# Patient Record
Sex: FEMALE | Race: WHITE | Employment: OTHER | URBAN - METROPOLITAN AREA
[De-identification: names, ages, dates, MRNs, and addresses within clinical notes are randomized per-mention and may not be internally consistent; named-entity substitution may affect disease eponyms.]

---

## 2017-01-01 ENCOUNTER — APPOINTMENT (OUTPATIENT)
Dept: GENERAL RADIOLOGY | Facility: HOSPITAL | Age: 82
DRG: 872 | End: 2017-01-01
Attending: EMERGENCY MEDICINE
Payer: MEDICARE

## 2017-01-01 ENCOUNTER — APPOINTMENT (OUTPATIENT)
Dept: INTERVENTIONAL RADIOLOGY/VASCULAR | Facility: HOSPITAL | Age: 82
DRG: 872 | End: 2017-01-01
Attending: NURSE PRACTITIONER
Payer: MEDICARE

## 2017-01-01 ENCOUNTER — HOSPITAL ENCOUNTER (INPATIENT)
Facility: HOSPITAL | Age: 82
LOS: 1 days | Discharge: HOSPICE | DRG: 872 | End: 2017-01-01
Attending: EMERGENCY MEDICINE | Admitting: INTERNAL MEDICINE
Payer: MEDICARE

## 2017-01-01 ENCOUNTER — APPOINTMENT (OUTPATIENT)
Dept: CT IMAGING | Facility: HOSPITAL | Age: 82
DRG: 872 | End: 2017-01-01
Attending: EMERGENCY MEDICINE
Payer: MEDICARE

## 2017-01-01 ENCOUNTER — HOSPITAL ENCOUNTER (INPATIENT)
Facility: HOSPITAL | Age: 82
LOS: 5 days | DRG: 872 | End: 2017-01-01
Attending: INTERNAL MEDICINE | Admitting: INTERNAL MEDICINE
Payer: OTHER MISCELLANEOUS

## 2017-01-01 ENCOUNTER — LAB REQUISITION (OUTPATIENT)
Dept: LAB | Facility: HOSPITAL | Age: 82
End: 2017-01-01
Attending: INTERNAL MEDICINE
Payer: MEDICARE

## 2017-01-01 VITALS
WEIGHT: 134 LBS | SYSTOLIC BLOOD PRESSURE: 78 MMHG | HEIGHT: 66 IN | BODY MASS INDEX: 21.53 KG/M2 | RESPIRATION RATE: 30 BRPM | OXYGEN SATURATION: 95 % | DIASTOLIC BLOOD PRESSURE: 36 MMHG | HEART RATE: 105 BPM | TEMPERATURE: 101 F

## 2017-01-01 VITALS
HEART RATE: 85 BPM | DIASTOLIC BLOOD PRESSURE: 20 MMHG | OXYGEN SATURATION: 82 % | RESPIRATION RATE: 18 BRPM | TEMPERATURE: 100 F | SYSTOLIC BLOOD PRESSURE: 55 MMHG

## 2017-01-01 DIAGNOSIS — K80.50 CHOLEDOCHOLITHIASIS: ICD-10-CM

## 2017-01-01 DIAGNOSIS — Z79.02 LONG TERM CURRENT USE OF ANTITHROMBOTICS/ANTIPLATELETS: ICD-10-CM

## 2017-01-01 DIAGNOSIS — K81.0 ACUTE CHOLECYSTITIS: Primary | ICD-10-CM

## 2017-01-01 LAB
ALBUMIN SERPL-MCNC: 2.5 G/DL (ref 3.5–4.8)
ALBUMIN SERPL-MCNC: 3.1 G/DL (ref 3.5–4.8)
ALBUMIN SERPL-MCNC: 3.1 G/DL (ref 3.5–4.8)
ALP LIVER SERPL-CCNC: 106 U/L (ref 55–142)
ALP LIVER SERPL-CCNC: 108 U/L (ref 55–142)
ALP LIVER SERPL-CCNC: 85 U/L (ref 55–142)
ALT SERPL-CCNC: 19 U/L (ref 14–54)
ALT SERPL-CCNC: 26 U/L (ref 14–54)
ALT SERPL-CCNC: 31 U/L (ref 14–54)
AST SERPL-CCNC: 18 U/L (ref 15–41)
AST SERPL-CCNC: 27 U/L (ref 15–41)
AST SERPL-CCNC: 28 U/L (ref 15–41)
BASOPHILS # BLD AUTO: 0.04 X10(3) UL (ref 0–0.1)
BASOPHILS # BLD AUTO: 0.05 X10(3) UL (ref 0–0.1)
BASOPHILS # BLD AUTO: 0.07 X10(3) UL (ref 0–0.1)
BASOPHILS NFR BLD AUTO: 0.1 %
BASOPHILS NFR BLD AUTO: 0.2 %
BASOPHILS NFR BLD AUTO: 0.4 %
BILIRUB SERPL-MCNC: 0.7 MG/DL (ref 0.1–2)
BILIRUB SERPL-MCNC: 0.8 MG/DL (ref 0.1–2)
BILIRUB SERPL-MCNC: 0.9 MG/DL (ref 0.1–2)
BUN BLD-MCNC: 27 MG/DL (ref 8–20)
BUN BLD-MCNC: 27 MG/DL (ref 8–20)
BUN BLD-MCNC: 30 MG/DL (ref 8–20)
CALCIUM BLD-MCNC: 8.3 MG/DL (ref 8.3–10.3)
CALCIUM BLD-MCNC: 9 MG/DL (ref 8.3–10.3)
CALCIUM BLD-MCNC: 9 MG/DL (ref 8.3–10.3)
CHLORIDE: 106 MMOL/L (ref 101–111)
CHLORIDE: 108 MMOL/L (ref 101–111)
CHLORIDE: 109 MMOL/L (ref 101–111)
CO2: 21 MMOL/L (ref 22–32)
CO2: 22 MMOL/L (ref 22–32)
CO2: 22 MMOL/L (ref 22–32)
CREAT BLD-MCNC: 1.31 MG/DL (ref 0.55–1.02)
CREAT BLD-MCNC: 1.41 MG/DL (ref 0.55–1.02)
CREAT BLD-MCNC: 1.5 MG/DL (ref 0.55–1.02)
EOSINOPHIL # BLD AUTO: 0 X10(3) UL (ref 0–0.3)
EOSINOPHIL # BLD AUTO: 0 X10(3) UL (ref 0–0.3)
EOSINOPHIL # BLD AUTO: 0.16 X10(3) UL (ref 0–0.3)
EOSINOPHIL NFR BLD AUTO: 0 %
EOSINOPHIL NFR BLD AUTO: 0 %
EOSINOPHIL NFR BLD AUTO: 0.8 %
ERYTHROCYTE [DISTWIDTH] IN BLOOD BY AUTOMATED COUNT: 15.5 % (ref 11.5–16)
ERYTHROCYTE [DISTWIDTH] IN BLOOD BY AUTOMATED COUNT: 15.7 % (ref 11.5–16)
ERYTHROCYTE [DISTWIDTH] IN BLOOD BY AUTOMATED COUNT: 15.8 % (ref 11.5–16)
GLUCOSE BLD-MCNC: 170 MG/DL (ref 65–99)
GLUCOSE BLD-MCNC: 177 MG/DL (ref 65–99)
GLUCOSE BLD-MCNC: 189 MG/DL (ref 65–99)
GLUCOSE BLD-MCNC: 205 MG/DL (ref 70–99)
GLUCOSE BLD-MCNC: 215 MG/DL (ref 70–99)
GLUCOSE BLD-MCNC: 217 MG/DL (ref 70–99)
GLUCOSE BLD-MCNC: 232 MG/DL (ref 65–99)
HAV IGM SER QL: 1.8 MG/DL (ref 1.7–3)
HCT VFR BLD AUTO: 35.3 % (ref 34–50)
HCT VFR BLD AUTO: 37 % (ref 34–50)
HCT VFR BLD AUTO: 39.5 % (ref 34–50)
HGB BLD-MCNC: 11.4 G/DL (ref 12–16)
HGB BLD-MCNC: 12 G/DL (ref 12–16)
HGB BLD-MCNC: 13.2 G/DL (ref 12–16)
IMMATURE GRANULOCYTE COUNT: 0.1 X10(3) UL (ref 0–1)
IMMATURE GRANULOCYTE COUNT: 0.2 X10(3) UL (ref 0–1)
IMMATURE GRANULOCYTE COUNT: 0.21 X10(3) UL (ref 0–1)
IMMATURE GRANULOCYTE RATIO %: 0.5 %
IMMATURE GRANULOCYTE RATIO %: 0.6 %
IMMATURE GRANULOCYTE RATIO %: 0.7 %
INR BLD: 1.52 (ref 0.89–1.11)
LACTIC ACID: 1.4 MMOL/L (ref 0.5–2)
LACTIC ACID: 1.7 MMOL/L (ref 0.5–2)
LACTIC ACID: 2.1 MMOL/L (ref 0.5–2)
LIPASE: 91 U/L (ref 73–393)
LYMPHOCYTES # BLD AUTO: 1.83 X10(3) UL (ref 0.9–4)
LYMPHOCYTES # BLD AUTO: 2.12 X10(3) UL (ref 0.9–4)
LYMPHOCYTES # BLD AUTO: 2.2 X10(3) UL (ref 0.9–4)
LYMPHOCYTES NFR BLD AUTO: 11.2 %
LYMPHOCYTES NFR BLD AUTO: 5.6 %
LYMPHOCYTES NFR BLD AUTO: 7.2 %
M PROTEIN MFR SERPL ELPH: 5.7 G/DL (ref 6.1–8.3)
M PROTEIN MFR SERPL ELPH: 6.1 G/DL (ref 6.1–8.3)
M PROTEIN MFR SERPL ELPH: 6.8 G/DL (ref 6.1–8.3)
MCH RBC QN AUTO: 29.6 PG (ref 27–33.2)
MCH RBC QN AUTO: 29.6 PG (ref 27–33.2)
MCH RBC QN AUTO: 30 PG (ref 27–33.2)
MCHC RBC AUTO-ENTMCNC: 32.3 G/DL (ref 31–37)
MCHC RBC AUTO-ENTMCNC: 32.4 G/DL (ref 31–37)
MCHC RBC AUTO-ENTMCNC: 33.4 G/DL (ref 31–37)
MCV RBC AUTO: 89.8 FL (ref 81–100)
MCV RBC AUTO: 91.4 FL (ref 81–100)
MCV RBC AUTO: 91.7 FL (ref 81–100)
MONOCYTES # BLD AUTO: 1.69 X10(3) UL (ref 0.1–0.6)
MONOCYTES # BLD AUTO: 2.21 X10(3) UL (ref 0.1–0.6)
MONOCYTES # BLD AUTO: 2.65 X10(3) UL (ref 0.1–0.6)
MONOCYTES NFR BLD AUTO: 6.7 %
MONOCYTES NFR BLD AUTO: 8.6 %
MONOCYTES NFR BLD AUTO: 9 %
NEUTROPHIL ABS PRELIM: 14.72 X10 (3) UL (ref 1.3–6.7)
NEUTROPHIL ABS PRELIM: 25.61 X10 (3) UL (ref 1.3–6.7)
NEUTROPHIL ABS PRELIM: 28.61 X10 (3) UL (ref 1.3–6.7)
NEUTROPHILS # BLD AUTO: 14.72 X10(3) UL (ref 1.3–6.7)
NEUTROPHILS # BLD AUTO: 25.61 X10(3) UL (ref 1.3–6.7)
NEUTROPHILS # BLD AUTO: 28.61 X10(3) UL (ref 1.3–6.7)
NEUTROPHILS NFR BLD AUTO: 78.1 %
NEUTROPHILS NFR BLD AUTO: 83.4 %
NEUTROPHILS NFR BLD AUTO: 86.9 %
PLATELET # BLD AUTO: 242 10(3)UL (ref 150–450)
PLATELET # BLD AUTO: 272 10(3)UL (ref 150–450)
PLATELET # BLD AUTO: 282 10(3)UL (ref 150–450)
POTASSIUM SERPL-SCNC: 4.2 MMOL/L (ref 3.6–5.1)
POTASSIUM SERPL-SCNC: 4.5 MMOL/L (ref 3.6–5.1)
POTASSIUM SERPL-SCNC: 4.6 MMOL/L (ref 3.6–5.1)
PSA SERPL DL<=0.01 NG/ML-MCNC: 18.5 SECONDS (ref 12–14.3)
RBC # BLD AUTO: 3.85 X10(6)UL (ref 3.8–5.1)
RBC # BLD AUTO: 4.05 X10(6)UL (ref 3.8–5.1)
RBC # BLD AUTO: 4.4 X10(6)UL (ref 3.8–5.1)
RED CELL DISTRIBUTION WIDTH-SD: 50.9 FL (ref 35.1–46.3)
RED CELL DISTRIBUTION WIDTH-SD: 52.8 FL (ref 35.1–46.3)
RED CELL DISTRIBUTION WIDTH-SD: 53.1 FL (ref 35.1–46.3)
SODIUM SERPL-SCNC: 138 MMOL/L (ref 136–144)
SODIUM SERPL-SCNC: 140 MMOL/L (ref 136–144)
SODIUM SERPL-SCNC: 140 MMOL/L (ref 136–144)
WBC # BLD AUTO: 18.9 X10(3) UL (ref 4–13)
WBC # BLD AUTO: 30.7 X10(3) UL (ref 4–13)
WBC # BLD AUTO: 32.9 X10(3) UL (ref 4–13)

## 2017-01-01 PROCEDURE — BF13YZZ FLUOROSCOPY OF GALLBLADDER AND BILE DUCTS USING OTHER CONTRAST: ICD-10-PCS | Performed by: RADIOLOGY

## 2017-01-01 PROCEDURE — 87186 SC STD MICRODIL/AGAR DIL: CPT | Performed by: INTERNAL MEDICINE

## 2017-01-01 PROCEDURE — 87205 SMEAR GRAM STAIN: CPT | Performed by: INTERNAL MEDICINE

## 2017-01-01 PROCEDURE — 87075 CULTR BACTERIA EXCEPT BLOOD: CPT | Performed by: INTERNAL MEDICINE

## 2017-01-01 PROCEDURE — 85025 COMPLETE CBC W/AUTO DIFF WBC: CPT | Performed by: INTERNAL MEDICINE

## 2017-01-01 PROCEDURE — 87070 CULTURE OTHR SPECIMN AEROBIC: CPT | Performed by: INTERNAL MEDICINE

## 2017-01-01 PROCEDURE — 0F9430Z DRAINAGE OF GALLBLADDER WITH DRAINAGE DEVICE, PERCUTANEOUS APPROACH: ICD-10-PCS | Performed by: RADIOLOGY

## 2017-01-01 PROCEDURE — 99223 1ST HOSP IP/OBS HIGH 75: CPT | Performed by: CLINICAL NURSE SPECIALIST

## 2017-01-01 PROCEDURE — 87077 CULTURE AEROBIC IDENTIFY: CPT | Performed by: INTERNAL MEDICINE

## 2017-01-01 PROCEDURE — 74176 CT ABD & PELVIS W/O CONTRAST: CPT | Performed by: EMERGENCY MEDICINE

## 2017-01-01 PROCEDURE — 80053 COMPREHEN METABOLIC PANEL: CPT | Performed by: INTERNAL MEDICINE

## 2017-01-01 PROCEDURE — 99291 CRITICAL CARE FIRST HOUR: CPT | Performed by: INTERNAL MEDICINE

## 2017-01-01 PROCEDURE — 71010 XR CHEST AP PORTABLE  (CPT=71010): CPT | Performed by: EMERGENCY MEDICINE

## 2017-01-01 RX ORDER — GLYCOPYRROLATE 0.2 MG/ML
0.4 INJECTION, SOLUTION INTRAMUSCULAR; INTRAVENOUS
Status: DISCONTINUED | OUTPATIENT
Start: 2017-01-01 | End: 2017-01-01

## 2017-01-01 RX ORDER — SODIUM CHLORIDE 9 MG/ML
INJECTION, SOLUTION INTRAVENOUS CONTINUOUS
Status: ACTIVE | OUTPATIENT
Start: 2017-01-01 | End: 2017-01-01

## 2017-01-01 RX ORDER — SCOLOPAMINE TRANSDERMAL SYSTEM 1 MG/1
1 PATCH, EXTENDED RELEASE TRANSDERMAL
Status: DISCONTINUED | OUTPATIENT
Start: 2017-01-01 | End: 2017-01-01

## 2017-01-01 RX ORDER — INSULIN ASPART 100 [IU]/ML
5 INJECTION, SOLUTION INTRAVENOUS; SUBCUTANEOUS EVERY EVENING
COMMUNITY
End: 2017-01-01

## 2017-01-01 RX ORDER — LORAZEPAM 2 MG/ML
2 INJECTION INTRAMUSCULAR EVERY 4 HOURS PRN
Status: CANCELLED | OUTPATIENT
Start: 2017-01-01

## 2017-01-01 RX ORDER — SODIUM CHLORIDE 9 MG/ML
INJECTION, SOLUTION INTRAVENOUS CONTINUOUS
Status: DISCONTINUED | OUTPATIENT
Start: 2017-01-01 | End: 2017-01-01

## 2017-01-01 RX ORDER — MORPHINE SULFATE 10 MG/5ML
10 SOLUTION ORAL EVERY 2 HOUR PRN
Status: DISCONTINUED | OUTPATIENT
Start: 2017-01-01 | End: 2017-01-01

## 2017-01-01 RX ORDER — HYDROMORPHONE HYDROCHLORIDE 4 MG/1
2 TABLET ORAL EVERY 2 HOUR PRN
Status: DISCONTINUED | OUTPATIENT
Start: 2017-01-01 | End: 2017-01-01

## 2017-01-01 RX ORDER — DEXTROSE MONOHYDRATE 25 G/50ML
50 INJECTION, SOLUTION INTRAVENOUS
Status: DISCONTINUED | OUTPATIENT
Start: 2017-01-01 | End: 2017-01-01

## 2017-01-01 RX ORDER — HYDROMORPHONE HYDROCHLORIDE 1 MG/ML
0.5 INJECTION, SOLUTION INTRAMUSCULAR; INTRAVENOUS; SUBCUTANEOUS
Status: DISCONTINUED | OUTPATIENT
Start: 2017-01-01 | End: 2017-01-01

## 2017-01-01 RX ORDER — ONDANSETRON 2 MG/ML
4 INJECTION INTRAMUSCULAR; INTRAVENOUS EVERY 6 HOURS PRN
Status: CANCELLED | OUTPATIENT
Start: 2017-01-01

## 2017-01-01 RX ORDER — INSULIN ASPART 100 [IU]/ML
4 INJECTION, SOLUTION INTRAVENOUS; SUBCUTANEOUS
COMMUNITY
End: 2017-01-01

## 2017-01-01 RX ORDER — ONDANSETRON 2 MG/ML
4 INJECTION INTRAMUSCULAR; INTRAVENOUS EVERY 6 HOURS PRN
Status: DISCONTINUED | OUTPATIENT
Start: 2017-01-01 | End: 2017-01-01

## 2017-01-01 RX ORDER — GARLIC EXTRACT 500 MG
1 CAPSULE ORAL DAILY
COMMUNITY
End: 2017-01-01

## 2017-01-01 RX ORDER — LORAZEPAM 2 MG/ML
1 INJECTION INTRAMUSCULAR EVERY 4 HOURS PRN
Status: DISCONTINUED | OUTPATIENT
Start: 2017-01-01 | End: 2017-01-01

## 2017-01-01 RX ORDER — HYDROMORPHONE HYDROCHLORIDE 1 MG/ML
0.5 INJECTION, SOLUTION INTRAMUSCULAR; INTRAVENOUS; SUBCUTANEOUS EVERY 4 HOURS PRN
Status: DISCONTINUED | OUTPATIENT
Start: 2017-01-01 | End: 2017-01-01

## 2017-01-01 RX ORDER — ONDANSETRON 4 MG/1
4 TABLET, ORALLY DISINTEGRATING ORAL EVERY 6 HOURS PRN
Status: DISCONTINUED | OUTPATIENT
Start: 2017-01-01 | End: 2017-01-01

## 2017-01-01 RX ORDER — MORPHINE SULFATE 10 MG/5ML
5 SOLUTION ORAL
Status: DISCONTINUED | OUTPATIENT
Start: 2017-01-01 | End: 2017-01-01

## 2017-01-01 RX ORDER — 0.9 % SODIUM CHLORIDE 0.9 %
3 VIAL (ML) INJECTION EVERY 8 HOURS
COMMUNITY
End: 2017-01-01

## 2017-01-01 RX ORDER — DILTIAZEM HYDROCHLORIDE 5 MG/ML
5 INJECTION INTRAVENOUS ONCE
Status: COMPLETED | OUTPATIENT
Start: 2017-01-01 | End: 2017-01-01

## 2017-01-01 RX ORDER — LORAZEPAM 2 MG/ML
2 INJECTION INTRAMUSCULAR EVERY 4 HOURS PRN
Status: DISCONTINUED | OUTPATIENT
Start: 2017-01-01 | End: 2017-01-01

## 2017-01-01 RX ORDER — DIGOXIN 0.25 MG/ML
250 INJECTION INTRAMUSCULAR; INTRAVENOUS ONCE
Status: COMPLETED | OUTPATIENT
Start: 2017-01-01 | End: 2017-01-01

## 2017-01-01 RX ORDER — LORAZEPAM 2 MG/ML
0.5 INJECTION INTRAMUSCULAR EVERY 4 HOURS PRN
Status: DISCONTINUED | OUTPATIENT
Start: 2017-01-01 | End: 2017-01-01

## 2017-01-01 RX ORDER — DILTIAZEM HYDROCHLORIDE 5 MG/ML
5 INJECTION INTRAVENOUS EVERY 6 HOURS PRN
Status: DISCONTINUED | OUTPATIENT
Start: 2017-01-01 | End: 2017-01-01

## 2017-01-01 RX ORDER — LATANOPROST 50 UG/ML
1 SOLUTION/ DROPS OPHTHALMIC NIGHTLY
COMMUNITY
End: 2017-01-01

## 2017-01-01 RX ORDER — BISACODYL 10 MG
10 SUPPOSITORY, RECTAL RECTAL
Status: DISCONTINUED | OUTPATIENT
Start: 2017-01-01 | End: 2017-01-01

## 2017-01-01 RX ORDER — LIDOCAINE HYDROCHLORIDE 10 MG/ML
INJECTION, SOLUTION INFILTRATION; PERINEURAL
Status: COMPLETED
Start: 2017-01-01 | End: 2017-01-01

## 2017-01-01 RX ORDER — CALCIUM CARBONATE 200(500)MG
1 TABLET,CHEWABLE ORAL EVERY 6 HOURS PRN
COMMUNITY
End: 2017-01-01

## 2017-01-01 RX ORDER — HYDROMORPHONE HYDROCHLORIDE 1 MG/ML
0.5 INJECTION, SOLUTION INTRAMUSCULAR; INTRAVENOUS; SUBCUTANEOUS
Status: CANCELLED | OUTPATIENT
Start: 2017-01-01

## 2017-01-01 RX ORDER — MELATONIN
325
COMMUNITY
End: 2017-01-01

## 2017-01-01 RX ORDER — LORAZEPAM 2 MG/ML
1 INJECTION INTRAMUSCULAR EVERY 4 HOURS PRN
Status: CANCELLED | OUTPATIENT
Start: 2017-01-01

## 2017-01-01 RX ORDER — ACETAMINOPHEN 650 MG/1
650 SUPPOSITORY RECTAL EVERY 4 HOURS PRN
Status: DISCONTINUED | OUTPATIENT
Start: 2017-01-01 | End: 2017-01-01

## 2017-01-01 RX ORDER — MIDAZOLAM HYDROCHLORIDE 1 MG/ML
INJECTION INTRAMUSCULAR; INTRAVENOUS
Status: COMPLETED
Start: 2017-01-01 | End: 2017-01-01

## 2017-09-18 PROBLEM — K81.0 ACUTE CHOLECYSTITIS: Status: ACTIVE | Noted: 2017-01-01

## 2017-09-18 PROBLEM — K80.50 CHOLEDOCHOLITHIASIS: Status: ACTIVE | Noted: 2017-01-01

## 2017-09-18 NOTE — ED PROVIDER NOTES
Patient Seen in: [facility] Emergency Department    History   Patient presents with:  Abnormal Labs    Stated Complaint: abnormal labs    HPI    The patient is an 44-year-old female who resides [facility], and is a palliative care patient, r 93  Resp: 16  Temp: 98.7 °F (37.1 °C)  Temp src: Temporal  SpO2: 95 %  O2 Device: None (Room air)    Current:/47   Pulse 89   Temp 99.1 °F (37.3 °C) (Oral)   Resp 18   Ht 167.6 cm (5' 6\")   Wt 60.8 kg   SpO2 98%   Breastfeeding?  No   BMI 21.63 kg/m² Abnormality         Status                     ---------                               -----------         ------                     CBC W/ DIFFERENTIAL[[id number]]          Abnormal            Final result                 Please view r distended. There is cholelithiasis with hyperdense layering sludge. There is pericholecystic fluid tracking to the region of the yaa hepatis. Gallbladder wall thickening with soft tissue stranding.  In addition there is new punctate gas in the region of t pleural thickening.   Dictated by: Daquan Pettit MD on 9/18/2017 at 18:20     Approved by: Daquan Pettit MD            Xr Chest Ap Portable  (cpt=71010)    Result Date: 9/18/2017  PROCEDURE:  XR CHEST AP PORTABLE (CPT=71010)  TECHNIQUE:  AP chest radiogra Date Reviewed: 5/23/2016          ICD-10-CM Noted POA    * (Principal)Acute cholecystitis K81.0 9/18/2017 Unknown    Choledocholithiasis K80.50 9/18/2017

## 2017-09-18 NOTE — ED INITIAL ASSESSMENT (HPI)
Pt sent here for elevated WBC of 28.17, pt c/o cough. Pt c/o RLQ abd pain, worse upon movement, pt family sts pt vomited x2 today and x4 since yesterday.

## 2017-09-19 PROBLEM — I48.91 ATRIAL FIBRILLATION WITH RVR (HCC): Status: ACTIVE | Noted: 2017-01-01

## 2017-09-19 PROBLEM — Z51.5 PALLIATIVE CARE ENCOUNTER: Status: ACTIVE | Noted: 2017-01-01

## 2017-09-19 PROBLEM — A41.9 SEPSIS (HCC): Status: ACTIVE | Noted: 2017-01-01

## 2017-09-19 NOTE — CONSULTS
[facility]                       [facility]astroenterology Consultation-[facility]    [facility] Patient Status:  Inpatient    1933 MRN [id number]   [facility] 3NW-A Attending Juan Villatoro MD   [facility] Day # 1 PCP Alexsandra Hahn premix/add-vantage 5 mg/hr Intravenous Continuous   0.9%  NaCl infusion  Intravenous Continuous   [COMPLETED] digoxin (LANOXIN) 250 MCG/ML injection 250 mcg 250 mcg Intravenous Once   [COMPLETED] Piperacillin Sod-Tazobactam So (ZOSYN) 3.375 g in dextrose 5 infections, hematuria, dysuria, or nephrolithiasis           Psychiatric:  no history of depression, anxiety, suicidal ideation, or homicidal ideation           Oncologic: no history of prior solid tumor or hematologic malignancy           ENT: no hoarsene 106  109   CO2  22.0  22.0  21.0*     Recent Labs   Lab  09/19/17   0311   RBC  4.05   HGB  12.0   HCT  37.0   MCV  91.4   MCH  29.6   MCHC  32.4   RDW  15.7   NEPRELIM  25.61*   WBC  30.7*   PLT  242.0       Recent Labs   Lab  09/17/17 2055 09/18/17 the head of the pancreas  PANCREAS:  Atrophic. SPLEEN:  Normal.  No enlargement or focal lesion. AORTA/VASCULAR:  No abdominal aortic aneurysm. Dense aortoiliac calcification. RETROPERITONEUM:  No enlarged adenopathy.     BOWEL/MESENTERY:  Normal c region of cystic duct and distal CBD) on CT imaging. Labs revealed leukocytosis (30.7 this AM) with normal LFTs. She has diffuse abd tenderness, vikash RUQ.  The pt and her daughter Crystal Baez are agreeable to IR cholecystostomy however would like to think about

## 2017-09-19 NOTE — PROGRESS NOTES
Per [facility] APN, all treatment team notified of patient's hospice status.  [address], Yo CISNEROS HENRI, message left for radiologist.)

## 2017-09-19 NOTE — PROGRESS NOTES
[facility] Dosing Service  Antibiotic Dosing    Aguila Sequeira is a 80year old female for whom pharmacy is dosing Zosyn for treatment of acute cholecystitis. Allergies: is allergic to turkey.     Vitals: /47   Pulse 89   Temp 99.1 °F (37.3 °C)

## 2017-09-19 NOTE — PROGRESS NOTES
Patient left for IR drain placement. Cardizem drip infusing at 5 mg/hr. IV fluids infusing as well at this time. Adelene Feeling GI PA speaking to patient's daughter Yordan Marvin. Vital signs stable. Will continue to monitor.

## 2017-09-19 NOTE — CONSULTS
[facility]  Cardiology Consultation    Rosita Montague Patient Status:  Inpatient    1933 MRN [id number]   [facility] 3NW-A Attending Ulises Alarcon MD   Hosp Day # 1 PCP Reshma Pineda MD     Reason for Consultation:  AFib with (CARDIZEM) premix/add-vantage, 5 mg/hr, Intravenous, Continuous  •  0.9%  NaCl infusion, , Intravenous, Continuous  •  ondansetron HCl (ZOFRAN) injection 4 mg, 4 mg, Intravenous, Q6H PRN  •  HYDROmorphone HCl PF (DILAUDID) 1 MG/ML injection 0.5 mg, 0.5 mg, 09/19/2017   HGB 12.0 09/19/2017   HCT 37.0 09/19/2017   .0 09/19/2017   CREATSERUM 1.31 09/19/2017   BUN 27 09/19/2017    09/19/2017   K 4.2 09/19/2017    09/19/2017   CO2 21.0 09/19/2017    09/19/2017   CA 8.3 09/19/2017   ALB 2

## 2017-09-19 NOTE — HOSPICE RN NOTE
Met with patients daughter and obtained hospice consents. Patient appears appropriate for inpatient hospice for management of pain/agitation. Due to abnormal vitals I do not feel she would be stable for transport to any other care setting at this time.  Wi

## 2017-09-19 NOTE — PROCEDURES
[facility]    Emerald Zelaya Patient Status:  Inpatient    1933 MRN [id number]   [facility] 3NW-A Attending Ector Stone MD   Hosp Day # 1 PCP Dilcia August MD         Brief Procedure Report    Pre-Operative Diagnosis: Acute

## 2017-09-19 NOTE — PROGRESS NOTES
Gomez catheter inserted at this time per order / family request. Patient tolerated well. 200 mL cristhian colored urine noted in gomez bag. Secured to right thigh.

## 2017-09-19 NOTE — PHYSICAL THERAPY NOTE
PT evaluation received, however a rapid response was called on pt this am d/t elevated HR and being hypotensive. Pt now off the floor for an IR drain placement for her gallbladder as well.  Per RN Enoc Nugent, pt will be having an ERCP performed either today or

## 2017-09-19 NOTE — CODE DOCUMENTATION
[facility]  RAPID RESPONSE NOTE     Rosa Bah Patient Status:  Inpatient    1933 MRN [id number]   Location [unfilled] Attending Juan Villatoro MD   Hosp Day # 1 PCP Carol Reeves MD     Reason for RRT: Rapid response called by RN at 2

## 2017-09-19 NOTE — PAYOR COMM NOTE
--------------  ADMISSION REVIEW     Payor: Kirby Jimi #:  [id number]  Authorization Number: N/A    Admit date: 9/18/17  Admit time: 2126       Admitting Physician: Alena Hess MD  Attending Physician:  Alena Hess MD  [facility] ---------                               -----------         ------                     CBC W/ DIFFERENTIAL[[id number]]          Abnormal            Final result                 Please view results for these tests on the individual orders.    SCAN Action Dose Route User    9/19/2017 0315 Given 5 mg Intravenous Giovanni Byrd RN      Insulin Aspart Pen (NOVOLOG) 100 UNIT/ML flexpen 1-5 Units     Date Action Dose Route User    9/19/2017 0714 Given 1 Units Subcutaneous (Left Upper Arm) Giovanni Byrd,

## 2017-09-19 NOTE — PLAN OF CARE
NURSING ADMISSION NOTE      Patient admitted via cart. Oriented to room. Safety precautions initiated. Bed in low position. Call light in reach.     CARDIOVASCULAR - ADULT    • Maintains optimal cardiac output and hemodynamic stability Progressing

## 2017-09-19 NOTE — CONSULTS
[address]  [id number]  Hospital Day #1  Date of Consult: 09/19/17       Reason for Consultation: Consult requested for evaluation of palliative care needs and goals of care discussion. of life: Daughter states she encouraged the patient to be evaluated for the abdominal pain and feels she needs to give the patient \"a chance\". Daughter states patient Genet Ann" in the past and wants to see if the patient recovers.   Yet, the daught PM

## 2017-09-19 NOTE — PROGRESS NOTES
Rapid response called at this time due to irregular patterns of blood pressure. HR consistent in 110's. Patient pale. Less responsive. Temp increasing to 100.3. No rigors noted.  [facility] Cardiology APN, Dr. Anaid Chan responded and spoke to patient's Katty Waldrop

## 2017-09-19 NOTE — PALLIATIVE CARE NOTE
PCSW sent referral to [facility] to evaluate for inpatient hospice. [facility] RN/Sri to meet with family this afternoon. No further PCSW needs. PC APN/Adilia notified.

## 2017-09-19 NOTE — PROCEDURES
[facility]  Pre-Procedure Note    Name: Nam Birch  MRN#: [id number]  : 1933    Procedure:  Ultrasound Guided Percutaneous cholecystostomy tube placement    Indication: Acute cholecystitis    Allergies:      Malian  [place]                  Glenny

## 2017-09-19 NOTE — PLAN OF CARE
Pt -170 this am. Hypotensive. Pt orientated. RR called. IVF bolus, IV cardizem and bolus provided during RR. Spoke with Dr. Ronald Ramirez update. Cardiology consulted, digoxin provided per cardiology. Pt daughter updated. Pt asking for her daughter.  Giuliana Lambert

## 2017-09-19 NOTE — PROGRESS NOTES
Patient returning from IR drain placement. R abdominal/flank dressing clean, dry and intact. Brown/red drainage noted in the tubing. Patient displaying rigors. Temperature 98. 3. . Blood pressure ranging from 140/113 - 87/45.  Noticed at this time the

## 2017-09-19 NOTE — CM/SW NOTE
MSW reviewed chart and called Roberto Epstein. Patient is LTC at [facility]. MSW directed by staff that patient was current with [facility].  MSW called [facility] [phone] and was told patient was seen for 1x visit but d[facility]c from services/not [facility]

## 2017-09-19 NOTE — H&P
[facility]    PATIENT'S NAME: Liane Mota   ATTENDING PHYSICIAN: Jennifer Gardner M.D.    PATIENT ACCOUNT#:   [de-identified]    LOCATION:  [address] [place] [address]  MEDICAL RECORD #:   [id number]       YOB: 1933  ADMISSION DATE:       09/18/2017 quit 14 years ago. CODE STATUS:  DO NOT RESUSCITATE. REVIEW OF SYSTEMS:  Positive for nausea. Positive for abdominal pain. Negative for vomiting. Negative for cough, congestions. Positive for fevers.   Negative for blood in urine, sputum, or st

## 2017-09-20 NOTE — PLAN OF CARE
COPING    • Pt/Family able to verbalize concerns and demonstrate effective coping strategies Progressing          PAIN - ADULT    • Verbalizes/displays adequate comfort level or patient's stated pain goal Progressing        Assumed care of patient at [address]

## 2017-09-20 NOTE — H&P
[address]    PATIENT'S NAME: Aurora Mcguire   ATTENDING PHYSICIAN: Anil Brand M.D.    PATIENT ACCOUNT#:   [de-identified]    LOCATION:  [address] [place] [address]  MEDICAL RECORD #:   [id number]       YOB: 1933  ADMISSION DATE:       09/19/2017 stool.        PHYSICAL EXAMINATION:    GENERAL:  Condition poor. lethargic. HEENT:  Head:  Limited exam.  Eyes:  Legally blind. NECK:  Supple. LUNGS:  Decreased breath sounds at the bases. No rhonchi or wheeze.    HEART:  Irregularly irregular rhy

## 2017-09-20 NOTE — PLAN OF CARE
Pt/Family able to verbalize concerns and demonstrate effective coping strategies Progressing      Verbalizes/displays adequate comfort level or patient's stated pain goal Progressing      Pt resting in bed this morning, pt appears to be comfortable.  Pt johnson

## 2017-09-20 NOTE — PROGRESS NOTES
09/19/17 2200   Clinical Encounter Type   Visited With Patient and family together   Routine Visit Introduction  ( paged by nurse for impending death. /)   Continue Visiting No   Crisis Visit Patient actively dying   Patient Spiritual Encounters

## 2017-09-20 NOTE — CM/SW NOTE
VIDA barnett  met with Pt's dtr's significant other who provided history of previous loss. The pt appeared comfortable, shallow but regular breathing. The pt's dtr went home to shower, as the couple had spent the night.  VIDA provided emotional support, active l

## 2017-09-20 NOTE — HOSPICE RN NOTE
Hospice inpatient visit    Received patient in bed, family is at bedside. Patient is unresponsive, does not appear to be in any pain or distress but appears imminent at this time.   Patients Hydromorphone drip is currently effective, titration orders are in

## 2017-09-20 NOTE — CM/SW NOTE
09/20/17 0800   CM/SW Screening   Referral Source Social Work (self-referral)   Information Source Chart review       MSW reviewed chart.  Patient is [facility] w[facility].

## 2017-09-21 NOTE — PROGRESS NOTES
09/21/17 0631   Clinical Encounter Type   Visited With Health care provider;Family Luz Ascencio volunteer Neo Cervantes)    came to relieve volunteer at 6 a.m.  debriefed with her, and then patient's daughter Shane Kennedy arrived.    provided

## 2017-09-21 NOTE — PROGRESS NOTES
09/21/17 0440   Patient Spiritual Encounters   Spiritual Interventions Volunteer Quynh Patient arrived and was instructed on procedures as this was her first [facility] [address]. Nurse informed  family is very timely, and said they would return at 10 a.m.   No more vo

## 2017-09-21 NOTE — PROGRESS NOTES
09/21/17 0327   Clinical Encounter Type   Crisis Visit Patient actively dying  (call to set up [facility])   Referral From Nurse   Referral To Manuelito Dugan was called by nurse Graeme Sims for a possible MARLENE patient burris.    arrived and assesse

## 2017-09-21 NOTE — PROGRESS NOTES
[facility]  Progress Note    Michela Pappas Patient Status:  Inpatient    1933 MRN [id number]   [facility] 3NW-A Attending Malu Amaro MD   Hosp Day # 2 PCP Sarbjit Rehman MD          Subjective:  Michela Pappas is a(n) 80 Disp:  Rfl:    furosemide (LASIX) 20 MG Oral Tab Take 20 mg by mouth daily. Indications: Edema Disp:  Rfl:    magnesium hydroxide (MILK OF MAGNESIA) 400 MG/5ML Oral Suspension Take 30 mL by mouth 2 (two) times daily as needed for constipation.  Disp:  Rfl:

## 2017-09-21 NOTE — PROGRESS NOTES
09/21/17 4131   Clinical Encounter Type   Visited With Patient and family together  (Daughter and significant other at bedside)   Routine Visit Follow-up   Continue Visiting Yes   Crisis Visit Patient actively dying   Patient's Supportive Strategies/Res

## 2017-09-21 NOTE — HOSPICE RN NOTE
General inpatient day 3- patient assessed in bed. She remains unresponsive. Dilaudid drip infusing at 0.4mg/hr. This appears to be keeping her comfortable at this time. Patient with audible secretions today. RN to administer Robinul. Daughter at bedside.

## 2017-09-21 NOTE — PLAN OF CARE
COPING    • Pt/Family able to verbalize concerns and demonstrate effective coping strategies Progressing        PAIN - ADULT    • Verbalizes/displays adequate comfort level or patient's stated pain goal Progressing          Upon assessment, patient unrespo

## 2017-09-21 NOTE — PLAN OF CARE
PT UNRESPONSIVE, ON COMFORT CARE, 6L NC, NO URINE IN BOWERS, DRAINED 200CC BROWN BILE FROM DRAIN, FAMILY WENT HOME DURING NIGHT, HAD  AND VOLUNTEER SIT WITH PT DURING NIGHT, DILAUDID CONTINUOUS GTT INFUSING AT 0.4MG/HR, 28ML REMAINING.    PAIN - ADUL

## 2017-09-22 NOTE — CM/SW NOTE
MSW spoke to Dtr to provide comfort and support. Dtr states she is very pleased with the staff here, that she feels the staff have been great in coming to see her and her mother, checking on her mother.     MSW and Dtr discussed possible transfer to [employer]

## 2017-09-22 NOTE — PROGRESS NOTES
Per charge RN, no bed available on med onc. Pt remains the same, groans when touched, shallow breathing on room air, minimal gomez output, appears comfortable.

## 2017-09-22 NOTE — HOSPICE RN NOTE
Discussed plan of care with both Dr. Fanta Guzman and Hospice medical director  [facility] both were in agreement to keep patient at [facility] on general inpatient level of care due to inability to ensure comfort at a lower level of care due to need for increased

## 2017-09-22 NOTE — PROGRESS NOTES
[facility]  Progress Note    Ron Lua Patient Status:  Inpatient    1933 MRN [id number]   [facility] 3NW-A Attending Lolis Lopez MD   Hosp Day # 3 PCP Fausto Fernando MD          Subjective:  Ron Lua is a(n) 80 0. 004 % Ophthalmic Solution Apply 1 drop to eye nightly. Disp:  Rfl:    furosemide (LASIX) 20 MG Oral Tab Take 20 mg by mouth daily.  Indications: Edema Disp:  Rfl:    magnesium hydroxide (MILK OF MAGNESIA) 400 MG/5ML Oral Suspension Take 30 mL by mouth 2 (

## 2017-09-22 NOTE — CM/SW NOTE
I called and spoke with Dr. Nikki Galdamez, on call for Dr. Barak Salazar, and MD is agreeable with pt to transfer to Med-Onc for ongoing hospice care when bed available. Updated Chg CINDA Padilla and Ade Marley on above.     Ute Painter, 09/22/17, 3:43 PM

## 2017-09-22 NOTE — PROGRESS NOTES
09/22/17 1310   Clinical Encounter Type   Visited With Patient and family together   Routine Visit Follow-up   Pts daughter looking for MARLENE again tonight. This  made many calls and has 1 person coming in from  to [address].    Asad

## 2017-09-22 NOTE — PLAN OF CARE
PT UNRESPONSIVE, MOANS OCCASIONALLY WHEN MOVED, 97% ON 2L, RESPIRATIONS AT 12, BOWERS DRAINED 20CC, DRAIN - 25CC, FAMILY WENT HOME AT NIGHT, VOLUNTEERS AT BESIDE 12AM - 0600, DILAUDID GTT INFUSING AT 0.6, 53ML REMAINING, COMFORT MEASURES IN PLACE.    PAIN -

## 2017-09-22 NOTE — PROGRESS NOTES
09/22/17 1223   Clinical Encounter Type   Visited With Patient and family together  (daughter : Russell Gutiérrez)   Routine Visit Follow-up   Continue Visiting (Encouraged to call  anytime for continuity of care/support )   Crisis Visit Patient a

## 2017-09-22 NOTE — CM/SW NOTE
Advised in rounds pt's B/P up to 105/41. I placed call to [facility] RN Nadiya Kothari who is currently enroute to see patient today. Asked Nadiya Kothari to give me a call after she has a chance to see patient today to advise on plan.     Pt currently Day 3 i

## 2017-09-22 NOTE — HOSPICE RN NOTE
General Inpatient day 4- Patient remains unresponsive. Dilaudid drip increased to 0.6mg /hr yesterday afternoon due to increased pain.  Patient also required a dose of IV ativan 1mg this morning for moaning/agitation, this seems to have been effective as s

## 2017-09-22 NOTE — CM/SW NOTE
Received call from [address] [employer], who advises she has spoken with Dr. Debra Grady and Nelson Gonzalez [address] MD Dr. Justin Aiken. Per Thea Montaño MD's and she feel pt remains imminent and not appropriate to move anywhere at this time.     Pt remains [facility]

## 2017-09-23 NOTE — CM/SW NOTE
9/23: Dr. Ganesh Aleman stating that he believes that patient is imminent possible within hours of death but he would leave transfer decisions up to [facility].  Also discussed with Uyen Wood from [facility]. who states that patient is not stable for

## 2017-09-23 NOTE — PLAN OF CARE
COPING    • Pt/Family able to verbalize concerns and demonstrate effective coping strategies Progressing        PAIN - ADULT    • Verbalizes/displays adequate comfort level or patient's stated pain goal Progressing          Patient unresponsive.  Moaning no

## 2017-09-23 NOTE — PROGRESS NOTES
Dr. Jeniffer Sanchez on unit and states he defers to hospice's plan of care for patient. States patient appears close to end of life. Patient's daughter updated on above. States she appreciates the care her mother has received here.  Charlee Paniagua hospice RN and Rios quinn

## 2017-09-23 NOTE — HOSPICE RN NOTE
Hospice inpatient visit    Received patient in bed, patient is unresponsive, does not appear to be in any pain or distress but appears to be imminent at this time. Agonal breathing noted.   Daughter is at bedside and declines any needs or concerns at this t

## 2017-09-23 NOTE — PLAN OF CARE
COPING    • Pt/Family able to verbalize concerns and demonstrate effective coping strategies Progressing        PAIN - ADULT    • Verbalizes/displays adequate comfort level or patient's stated pain goal Progressing          Received call from [employer], pt. +V

## 2017-09-23 NOTE — PROGRESS NOTES
Called Dr. Fanta Guzman with VRE result and temp, instructed to call Hospice since pt. Is hospice. Called [facility], relayed to Guido Price that pt. +VRE aerobic culture biliary drain and Temp max 100. She will relay to .     Educated daughter abo

## 2017-09-23 NOTE — PROGRESS NOTES
[facility]  Progress Note    Davy Juárez Patient Status:  Inpatient    1933 MRN [id number]   [facility] 3NW-A Attending Silvino Arriaga MD   Hosp Day # 4 PCP Ronan Villarreal MD          Subjective:  Davy Juárez is a(n) 80 furosemide (LASIX) 20 MG Oral Tab Take 20 mg by mouth daily. Indications: Edema Disp:  Rfl:    magnesium hydroxide (MILK OF MAGNESIA) 400 MG/5ML Oral Suspension Take 30 mL by mouth 2 (two) times daily as needed for constipation.  Disp:  Rfl:    Potassium

## 2017-09-24 NOTE — PROGRESS NOTES
Pt with agonal breathing & mottling noted to BLE. Daughter Astrid Bright called with change in status, informed would be a good time to come to hospital.  Kandi Arrant called to come to pt bedside. Absence of breathing noted at 0037.   Apical pulse checked with Barrera Mckeon

## 2017-09-24 NOTE — PROGRESS NOTES
Pt dtr Dwain Irving notified regarding increase in pts PCA settings. Additional updates provided. Will monitor.

## 2017-09-25 NOTE — PAYOR COMM NOTE
--------------  CONTINUED STAY REVIEW    Dionicio Venus  Subscriber #:  [id number]  Authorization Number: N/A    Admit date: 9/19/17  Admit time: [address]    Admitting Physician: Robert Ornelas MD  Attending Physician:  No att. providers found  [facility] abdominal pain and feels she needs to give the patient \"a chance\". Daughter states patient Mally Saver" in the past and wants to see if the patient recovers.   Yet, the daughter is adamantly opposed to ICU transfer, pressors, or other surgical procedu

## 2017-10-17 NOTE — DISCHARGE SUMMARY
[facility]  Discharge Summary    Larry Louis Patient Status:  Inpatient    1933 MRN [id number]   [facility] 3NW-A Attending No att. providers found   [facility] Day # 5 PCP Darlin Navarro MD     Date of Admission: 2017    Date Regular rate and rhythm, S1 and S2 normal, no murmur, rub   or gallop   Abdomen:     Soft, non-tender, bowel sounds active                     Extremities:    no cyanosis or edema                                 No current facility-administered medicati Sepsis ([facility] [address]        Plan:  Continue present management,  See tests ordered,  1.   acute abdominal pain consistent with acute cholecystitis.  - hospice     2.       Leukocytosis, sepsis, related acute cholecystitis.    3.       Hyperglycemia related to insu needed for constipation. , Historical    Travoprost, EDIS Free, 0.004 % Ophthalmic Solution  Apply 1 drop to eye nightly., Historical    furosemide (LASIX) 20 MG Oral Tab  Take 20 mg by mouth daily.  Indications: Edema, Historical    magnesium hydroxide (MILK